# Patient Record
Sex: FEMALE | Race: WHITE | Employment: OTHER | URBAN - NONMETROPOLITAN AREA
[De-identification: names, ages, dates, MRNs, and addresses within clinical notes are randomized per-mention and may not be internally consistent; named-entity substitution may affect disease eponyms.]

---

## 2023-06-15 ENCOUNTER — OFFICE VISIT (OUTPATIENT)
Dept: MEDICAL GROUP | Facility: PHYSICIAN GROUP | Age: 63
End: 2023-06-15
Payer: COMMERCIAL

## 2023-06-15 ENCOUNTER — HOSPITAL ENCOUNTER (OUTPATIENT)
Facility: MEDICAL CENTER | Age: 63
End: 2023-06-15
Attending: NURSE PRACTITIONER
Payer: COMMERCIAL

## 2023-06-15 VITALS
DIASTOLIC BLOOD PRESSURE: 62 MMHG | BODY MASS INDEX: 21.79 KG/M2 | OXYGEN SATURATION: 97 % | TEMPERATURE: 97.8 F | WEIGHT: 123 LBS | SYSTOLIC BLOOD PRESSURE: 108 MMHG | HEIGHT: 63 IN | RESPIRATION RATE: 14 BRPM | HEART RATE: 67 BPM

## 2023-06-15 DIAGNOSIS — M62.838 MUSCLE SPASMS OF NECK: ICD-10-CM

## 2023-06-15 DIAGNOSIS — E78.2 MIXED HYPERLIPIDEMIA: ICD-10-CM

## 2023-06-15 DIAGNOSIS — D31.32 NEVUS OF CHOROID OF LEFT EYE: ICD-10-CM

## 2023-06-15 DIAGNOSIS — R42 VERTIGO: ICD-10-CM

## 2023-06-15 DIAGNOSIS — D22.9 BENIGN SKIN MOLE: ICD-10-CM

## 2023-06-15 DIAGNOSIS — N94.10 DYSPAREUNIA IN FEMALE: ICD-10-CM

## 2023-06-15 DIAGNOSIS — Z96.1 PSEUDOPHAKIA: ICD-10-CM

## 2023-06-15 PROCEDURE — 99204 OFFICE O/P NEW MOD 45 MIN: CPT | Performed by: NURSE PRACTITIONER

## 2023-06-15 PROCEDURE — G0481 DRUG TEST DEF 8-14 CLASSES: HCPCS

## 2023-06-15 PROCEDURE — 3078F DIAST BP <80 MM HG: CPT | Performed by: NURSE PRACTITIONER

## 2023-06-15 PROCEDURE — 3074F SYST BP LT 130 MM HG: CPT | Performed by: NURSE PRACTITIONER

## 2023-06-15 RX ORDER — DIAZEPAM 5 MG/1
5 TABLET ORAL DAILY
COMMUNITY
End: 2023-06-15 | Stop reason: SDUPTHER

## 2023-06-15 RX ORDER — DIAZEPAM 5 MG/1
5 TABLET ORAL
Qty: 20 TABLET | Refills: 0 | Status: SHIPPED | OUTPATIENT
Start: 2023-06-15 | End: 2023-07-15

## 2023-06-15 SDOH — ECONOMIC STABILITY: FOOD INSECURITY: WITHIN THE PAST 12 MONTHS, YOU WORRIED THAT YOUR FOOD WOULD RUN OUT BEFORE YOU GOT MONEY TO BUY MORE.: PATIENT DECLINED

## 2023-06-15 SDOH — HEALTH STABILITY: PHYSICAL HEALTH: ON AVERAGE, HOW MANY MINUTES DO YOU ENGAGE IN EXERCISE AT THIS LEVEL?: 30 MIN

## 2023-06-15 SDOH — ECONOMIC STABILITY: HOUSING INSECURITY

## 2023-06-15 SDOH — ECONOMIC STABILITY: TRANSPORTATION INSECURITY
IN THE PAST 12 MONTHS, HAS LACK OF TRANSPORTATION KEPT YOU FROM MEETINGS, WORK, OR FROM GETTING THINGS NEEDED FOR DAILY LIVING?: PATIENT DECLINED

## 2023-06-15 SDOH — ECONOMIC STABILITY: FOOD INSECURITY: WITHIN THE PAST 12 MONTHS, THE FOOD YOU BOUGHT JUST DIDN'T LAST AND YOU DIDN'T HAVE MONEY TO GET MORE.: PATIENT DECLINED

## 2023-06-15 SDOH — ECONOMIC STABILITY: HOUSING INSECURITY
IN THE LAST 12 MONTHS, WAS THERE A TIME WHEN YOU DID NOT HAVE A STEADY PLACE TO SLEEP OR SLEPT IN A SHELTER (INCLUDING NOW)?: PATIENT REFUSED

## 2023-06-15 SDOH — ECONOMIC STABILITY: TRANSPORTATION INSECURITY
IN THE PAST 12 MONTHS, HAS LACK OF RELIABLE TRANSPORTATION KEPT YOU FROM MEDICAL APPOINTMENTS, MEETINGS, WORK OR FROM GETTING THINGS NEEDED FOR DAILY LIVING?: PATIENT DECLINED

## 2023-06-15 SDOH — ECONOMIC STABILITY: INCOME INSECURITY: IN THE LAST 12 MONTHS, WAS THERE A TIME WHEN YOU WERE NOT ABLE TO PAY THE MORTGAGE OR RENT ON TIME?: PATIENT REFUSED

## 2023-06-15 SDOH — HEALTH STABILITY: PHYSICAL HEALTH: ON AVERAGE, HOW MANY DAYS PER WEEK DO YOU ENGAGE IN MODERATE TO STRENUOUS EXERCISE (LIKE A BRISK WALK)?: 5 DAYS

## 2023-06-15 SDOH — ECONOMIC STABILITY: TRANSPORTATION INSECURITY
IN THE PAST 12 MONTHS, HAS THE LACK OF TRANSPORTATION KEPT YOU FROM MEDICAL APPOINTMENTS OR FROM GETTING MEDICATIONS?: PATIENT DECLINED

## 2023-06-15 SDOH — ECONOMIC STABILITY: INCOME INSECURITY: HOW HARD IS IT FOR YOU TO PAY FOR THE VERY BASICS LIKE FOOD, HOUSING, MEDICAL CARE, AND HEATING?: PATIENT DECLINED

## 2023-06-15 SDOH — HEALTH STABILITY: MENTAL HEALTH
STRESS IS WHEN SOMEONE FEELS TENSE, NERVOUS, ANXIOUS, OR CAN'T SLEEP AT NIGHT BECAUSE THEIR MIND IS TROUBLED. HOW STRESSED ARE YOU?: NOT AT ALL

## 2023-06-15 ASSESSMENT — SOCIAL DETERMINANTS OF HEALTH (SDOH)
WITHIN THE PAST 12 MONTHS, YOU WORRIED THAT YOUR FOOD WOULD RUN OUT BEFORE YOU GOT THE MONEY TO BUY MORE: PATIENT DECLINED
ARE YOU MARRIED, WIDOWED, DIVORCED, SEPARATED, NEVER MARRIED, OR LIVING WITH A PARTNER?: PATIENT DECLINED
HOW OFTEN DO YOU HAVE SIX OR MORE DRINKS ON ONE OCCASION: PATIENT DECLINED
IN A TYPICAL WEEK, HOW MANY TIMES DO YOU TALK ON THE PHONE WITH FAMILY, FRIENDS, OR NEIGHBORS?: PATIENT DECLINED
IN A TYPICAL WEEK, HOW MANY TIMES DO YOU TALK ON THE PHONE WITH FAMILY, FRIENDS, OR NEIGHBORS?: PATIENT DECLINED
DO YOU BELONG TO ANY CLUBS OR ORGANIZATIONS SUCH AS CHURCH GROUPS UNIONS, FRATERNAL OR ATHLETIC GROUPS, OR SCHOOL GROUPS?: PATIENT DECLINED
HOW MANY DRINKS CONTAINING ALCOHOL DO YOU HAVE ON A TYPICAL DAY WHEN YOU ARE DRINKING: PATIENT DECLINED
HOW OFTEN DO YOU ATTEND CHURCH OR RELIGIOUS SERVICES?: PATIENT DECLINED
HOW OFTEN DO YOU GET TOGETHER WITH FRIENDS OR RELATIVES?: PATIENT DECLINED
HOW OFTEN DO YOU ATTEND CHURCH OR RELIGIOUS SERVICES?: PATIENT DECLINED
HOW OFTEN DO YOU HAVE A DRINK CONTAINING ALCOHOL: PATIENT DECLINED
HOW HARD IS IT FOR YOU TO PAY FOR THE VERY BASICS LIKE FOOD, HOUSING, MEDICAL CARE, AND HEATING?: PATIENT DECLINED
DO YOU BELONG TO ANY CLUBS OR ORGANIZATIONS SUCH AS CHURCH GROUPS UNIONS, FRATERNAL OR ATHLETIC GROUPS, OR SCHOOL GROUPS?: PATIENT DECLINED
HOW OFTEN DO YOU ATTENT MEETINGS OF THE CLUB OR ORGANIZATION YOU BELONG TO?: PATIENT DECLINED
ARE YOU MARRIED, WIDOWED, DIVORCED, SEPARATED, NEVER MARRIED, OR LIVING WITH A PARTNER?: PATIENT DECLINED
HOW OFTEN DO YOU ATTENT MEETINGS OF THE CLUB OR ORGANIZATION YOU BELONG TO?: PATIENT DECLINED
HOW OFTEN DO YOU GET TOGETHER WITH FRIENDS OR RELATIVES?: PATIENT DECLINED

## 2023-06-15 ASSESSMENT — ENCOUNTER SYMPTOMS
CHILLS: 0
PSYCHIATRIC NEGATIVE: 1
WEIGHT LOSS: 0
FEVER: 0
SHORTNESS OF BREATH: 0
GASTROINTESTINAL NEGATIVE: 1
NEUROLOGICAL NEGATIVE: 1
EYES NEGATIVE: 1
MUSCULOSKELETAL NEGATIVE: 1

## 2023-06-15 ASSESSMENT — LIFESTYLE VARIABLES
HOW MANY STANDARD DRINKS CONTAINING ALCOHOL DO YOU HAVE ON A TYPICAL DAY: PATIENT DECLINED
AUDIT-C TOTAL SCORE: -1
SKIP TO QUESTIONS 9-10: 0
HOW OFTEN DO YOU HAVE A DRINK CONTAINING ALCOHOL: PATIENT DECLINED
HOW OFTEN DO YOU HAVE SIX OR MORE DRINKS ON ONE OCCASION: PATIENT DECLINED

## 2023-06-15 ASSESSMENT — FIBROSIS 4 INDEX: FIB4 SCORE: 0.76

## 2023-06-15 ASSESSMENT — PATIENT HEALTH QUESTIONNAIRE - PHQ9: CLINICAL INTERPRETATION OF PHQ2 SCORE: 0

## 2023-06-15 NOTE — PROGRESS NOTES
Chief Complaint   Patient presents with    Establish Care    Referral Needed      Subjective:     Doris Grullon is a 62 y.o. female presenting for      Problem   Dyspareunia in Female    Patient states a history of this.  Any that she has noted.  She states that has caused some significant discomfort for her in the past and has never felt comfortable talking to anybody about this.  She denies any use of any type of medications or lubricants to help out with this but is interested today.     Muscle Spasms of Neck    History of shoulder pain for years  Takes valium 5 mg as needed  Notes it is on her side she hold leash with dogs and they pull at times  Uses advil more often but notes that when she does needs to use it it is rare   Notes last refill was 10/2022  Requesting refill  Has never completed PT for discomfort but stretches at home often       Vertigo    Patient notes that she has had this for many years.  She states it mostly happens in April at times causing her violent nausea vomiting.  She states that she uses the Epley maneuver as needed.  She also notes however that Valium 5 mg she uses for the vertigo.       Mixed Hyperlipidemia    Has been eating more dessert after dinner  Not as active as she use to be since being retired  Labs from 9/2022     Pseudophakia    History of surgery     Insufficiency of Tear Film of Both Eyes   Nevus of Choroid of Left Eye    Follows with Dr. Irene ophthalmology who continues to track this          Review of Systems   Constitutional:  Negative for chills, fever, malaise/fatigue and weight loss.   HENT: Negative.     Eyes: Negative.    Respiratory:  Negative for shortness of breath.    Cardiovascular:  Negative for chest pain.   Gastrointestinal: Negative.    Genitourinary: Negative.    Musculoskeletal: Negative.    Skin: Negative.    Neurological: Negative.    Psychiatric/Behavioral: Negative.            Current Outpatient Medications:     diazePAM (VALIUM) 5 MG Tab,  Take 1 Tablet by mouth one time as needed for Anxiety for up to 30 days., Disp: 20 Tablet, Rfl: 0    multivitamin (THERAGRAN) Tab, Take 1 Tablet by mouth every day., Disp: , Rfl:     Famotidine (PEPCID PO), Take  by mouth., Disp: , Rfl:     Calcium Carbonate Antacid (TUMS PO), Take  by mouth., Disp: , Rfl:     Past Medical History:   Diagnosis Date    Allergy     Arthritis     Back pain     Back pain     Cataract     GERD (gastroesophageal reflux disease)     Heart murmur     Heartburn     Insomnia     Neck stiffness        Past Surgical History:   Procedure Laterality Date    EYE SURGERY  2010    cataract        Social History     Tobacco Use    Smoking status: Former     Packs/day: 1.00     Years: 10.00     Pack years: 10.00     Types: Cigarettes     Quit date: 3/23/1990     Years since quittin.2    Smokeless tobacco: Never   Vaping Use    Vaping Use: Never used   Substance Use Topics    Alcohol use: Yes     Alcohol/week: 2.4 oz     Types: 2 Glasses of wine, 2 Cans of beer per week    Drug use: Never       Family History   Problem Relation Age of Onset    Heart Disease Mother         chf    Hypertension Mother     Lung Disease Mother     Psychiatric Illness Mother         Paranoid Schizophrenia    Cancer Father         Prostate Cancer that metastasized to his bones    Heart Disease Father         Heart Attack    No Known Problems Sister     No Known Problems Sister     Cancer Brother         Recently diagnosed 10/2022with stage 4 prostate cancer that has metastasized to his bones    No Known Problems Brother     No Known Problems Maternal Grandmother     No Known Problems Maternal Grandfather     No Known Problems Paternal Grandmother     No Known Problems Paternal Grandfather        Sulfisoxazole acetyl    Allergies, past medical history, past surgical history, family history, social history reviewed and updated    Objective:     Vitals: /62 (BP Location: Left arm, Patient Position: Sitting, BP Cuff  "Size: Adult)   Pulse 67   Temp 36.6 °C (97.8 °F) (Temporal)   Resp 14   Ht 1.6 m (5' 3\")   Wt 55.8 kg (123 lb)   SpO2 97%   BMI 21.79 kg/m²     Physical Exam  Constitutional:       Appearance: Normal appearance. She is normal weight.   HENT:      Head: Normocephalic and atraumatic.      Right Ear: Tympanic membrane, ear canal and external ear normal.      Left Ear: Tympanic membrane, ear canal and external ear normal.      Nose: Nose normal.      Mouth/Throat:      Mouth: Mucous membranes are moist.      Pharynx: Oropharynx is clear.   Eyes:      Extraocular Movements: Extraocular movements intact.      Conjunctiva/sclera: Conjunctivae normal.      Pupils: Pupils are equal, round, and reactive to light.   Cardiovascular:      Rate and Rhythm: Normal rate and regular rhythm.      Pulses: Normal pulses.      Heart sounds: Normal heart sounds.   Pulmonary:      Effort: Pulmonary effort is normal.      Breath sounds: Normal breath sounds.   Abdominal:      General: Abdomen is flat. Bowel sounds are normal.      Palpations: Abdomen is soft.   Musculoskeletal:         General: Normal range of motion.      Cervical back: Normal range of motion and neck supple. No tenderness.   Lymphadenopathy:      Cervical: No cervical adenopathy.   Skin:     General: Skin is warm and dry.      Capillary Refill: Capillary refill takes less than 2 seconds.   Neurological:      General: No focal deficit present.      Mental Status: She is alert and oriented to person, place, and time.   Psychiatric:         Mood and Affect: Mood normal.         Behavior: Behavior normal.         Thought Content: Thought content normal.         Judgment: Judgment normal.         Assessment/Plan:     1. Nevus of choroid of left eye  Chronic and stable condition  - Referral to Ophthalmology    2. Dyspareunia in female  Chronic and stable condition  Discussed today multiple options such as her replacement therapy but also lubrication that she can order " online.  We discussed the best use would be something that is pH balance does not affect her pH of the vaginal canal as well as a water-soluble lubrication.  Instructed patient to try this if she notes no improvement that we can discuss possible hormone replacement therapy vaginally    3. Muscle spasms of neck  Chronic and stable condition  PDMP reviewed  - diazePAM (VALIUM) 5 MG Tab; Take 1 Tablet by mouth one time as needed for Anxiety for up to 30 days.  Dispense: 20 Tablet; Refill: 0  - Controlled Substance Treatment Agreement  - Pain Management Screen; Future    4. Benign skin mole  Chronic and stable condition  - Referral to Dermatology    5. Vertigo  Chronic and stable condition  PDMP reviewed  - diazePAM (VALIUM) 5 MG Tab; Take 1 Tablet by mouth one time as needed for Anxiety for up to 30 days.  Dispense: 20 Tablet; Refill: 0  - Controlled Substance Treatment Agreement  - Pain Management Screen; Future    6. Mixed hyperlipidemia  Chronic and stable condition    7. Pseudophakia  Chronic and stable condition       Discussed with patient possible alternative diagnoses, patient is to take all medications as prescribed.     If symptoms persist FU w/PCP, if symptoms worsen go to emergency room.     If experiencing any side effects from prescribed medications reports to the office immediately or go to emergency room.    Reviewed indication, dosage, usage and potential adverse effects of prescribed medications.     Reviewed risks and benefits of treatment plan. Patient verbalizes understanding of all instruction and verbally agrees to plan.    Discussed plan with the patient, and she agrees to the above.      I personally reviewed prior external notes and test results pertinent to today's visit.        Return for Pending labs in September.      Please note that this dictation was created using voice recognition software. I have made every reasonable attempt to correct obvious errors, but I expect that there may be  errors of grammar and possibly content that I did not discover before finalizing the note.

## 2023-06-20 LAB
1OH-MIDAZOLAM UR QL SCN: NOT DETECTED
6MAM UR QL: NOT DETECTED
7AMINOCLONAZEPAM UR QL: NOT DETECTED
A-OH ALPRAZ UR QL: NOT DETECTED
ALPRAZ UR QL: NOT DETECTED
AMPHET UR QL SCN: NOT DETECTED
ANNOTATION COMMENT IMP: NORMAL
ANNOTATION COMMENT IMP: NORMAL
BARBITURATES UR QL: NOT DETECTED
BUPRENORPHINE UR QL: NOT DETECTED
BZE UR QL: NOT DETECTED
CARBOXYTHC UR QL: NOT DETECTED
CARISOPRODOL UR QL: NOT DETECTED
CLONAZEPAM UR QL: NOT DETECTED
CODEINE UR QL: NOT DETECTED
DIAZEPAM UR QL: NOT DETECTED
ETHYL GLUCURONIDE UR QL: NOT DETECTED
FENTANYL UR QL: NOT DETECTED
GABAPENTIN UR QL: NOT DETECTED
HYDROCODONE UR QL: NOT DETECTED
HYDROMORPHONE UR QL: NOT DETECTED
LORAZEPAM UR QL: NOT DETECTED
MDA UR QL: NOT DETECTED
MDEA UR QL: NOT DETECTED
MDMA UR QL: NOT DETECTED
MEPERIDINE UR QL: NOT DETECTED
METHADONE UR QL: NOT DETECTED
METHAMPHET UR QL: NOT DETECTED
MIDAZOLAM UR QL SCN: NOT DETECTED
MORPHINE UR QL: NOT DETECTED
NALOXONE UR QL SCN: NOT DETECTED
NORBUPRENORPHINE UR QL CFM: NOT DETECTED
NORDIAZEPAM UR QL: NOT DETECTED
NORFENTANYL UR QL: NOT DETECTED
NORHYDROCODONE UR QL CFM: NOT DETECTED
NOROXYCODONE UR QL CFM: NOT DETECTED
NOROXYMORPH CO100 Q0458: NOT DETECTED
OXAZEPAM UR QL: NOT DETECTED
OXYCODONE UR QL: NOT DETECTED
OXYMORPHONE UR QL: NOT DETECTED
PATHOLOGY STUDY: NORMAL
PCP UR QL: NOT DETECTED
PHENTERMINE UR QL: NOT DETECTED
PPAA UR QL: NOT DETECTED
PREGABALIN UR QL SCN: NOT DETECTED
SERVICE CMNT-IMP: NORMAL
TAPENADOL OSULF CO200 Q0473: NOT DETECTED
TAPENTADOL UR QL SCN: NOT DETECTED
TEMAZEPAM UR QL: NOT DETECTED
TRAMADOL UR QL: NOT DETECTED
ZOLPIDEM PHENYL-4-CARB UR QL SCN: NOT DETECTED
ZOLPIDEM UR QL: NOT DETECTED

## 2023-09-19 ENCOUNTER — OFFICE VISIT (OUTPATIENT)
Dept: MEDICAL GROUP | Facility: PHYSICIAN GROUP | Age: 63
End: 2023-09-19
Payer: COMMERCIAL

## 2023-09-19 VITALS
SYSTOLIC BLOOD PRESSURE: 112 MMHG | TEMPERATURE: 97.9 F | WEIGHT: 117 LBS | RESPIRATION RATE: 12 BRPM | HEART RATE: 66 BPM | BODY MASS INDEX: 20.73 KG/M2 | OXYGEN SATURATION: 97 % | DIASTOLIC BLOOD PRESSURE: 68 MMHG | HEIGHT: 63 IN

## 2023-09-19 DIAGNOSIS — R19.7 DIARRHEA, UNSPECIFIED TYPE: ICD-10-CM

## 2023-09-19 PROCEDURE — 3074F SYST BP LT 130 MM HG: CPT | Performed by: NURSE PRACTITIONER

## 2023-09-19 PROCEDURE — 3078F DIAST BP <80 MM HG: CPT | Performed by: NURSE PRACTITIONER

## 2023-09-19 PROCEDURE — 99213 OFFICE O/P EST LOW 20 MIN: CPT | Performed by: NURSE PRACTITIONER

## 2023-09-19 ASSESSMENT — ENCOUNTER SYMPTOMS
WEIGHT LOSS: 0
DIARRHEA: 1
CHILLS: 0
SHORTNESS OF BREATH: 0
NAUSEA: 0
ABDOMINAL PAIN: 0
CONSTIPATION: 0
BLOOD IN STOOL: 0
VOMITING: 0
HEADACHES: 0
FEVER: 0
DIZZINESS: 0

## 2023-09-19 ASSESSMENT — FIBROSIS 4 INDEX: FIB4 SCORE: 0.77

## 2023-09-19 NOTE — PROGRESS NOTES
"CC:  Chief Complaint   Patient presents with    Diarrhea     X4wk, states taking over the counter medication        HISTORY OF PRESENT ILLNESS: Patient is a 63 y.o. female established patient presenting     Problem   Diarrhea    Chronic condition for many years however worse in the last 4 weeks  Notes increase in gas   Has been recently using gummies and notes it has sugar alcohol in it and she has problems with it   Notes after eating   Has been taking imodium, pepto and pepcid  Denies foul odor  Denies recent travel, antibiotics,   denies pain, blood in stool, chills, fevers, no change in color           Review of Systems   Constitutional:  Negative for chills, fever, malaise/fatigue and weight loss.   Respiratory:  Negative for shortness of breath.    Cardiovascular:  Negative for chest pain.   Gastrointestinal:  Positive for diarrhea. Negative for abdominal pain, blood in stool, constipation, melena, nausea and vomiting.   Neurological:  Negative for dizziness and headaches.             - NOTE: All other systems reviewed and are negative, except as in HPI.      Exam:    /68   Pulse 66   Temp 36.6 °C (97.9 °F) (Temporal)   Resp 12   Ht 1.6 m (5' 3\")   Wt 53.1 kg (117 lb)   SpO2 97%  Body mass index is 20.73 kg/m².    Physical Exam  Constitutional:       General: She is not in acute distress.     Appearance: Normal appearance. She is normal weight. She is not ill-appearing.   HENT:      Head: Normocephalic and atraumatic.   Abdominal:      General: Abdomen is flat. There is no distension.      Palpations: Abdomen is soft. There is no mass.      Tenderness: There is no abdominal tenderness. There is no right CVA tenderness, left CVA tenderness, guarding or rebound.      Hernia: No hernia is present.      Comments: Hyperactive bowel sounds   Skin:     General: Skin is warm and dry.      Capillary Refill: Capillary refill takes less than 2 seconds.   Neurological:      Mental Status: She is alert and " oriented to person, place, and time.   Psychiatric:         Behavior: Behavior normal.           Assessment/Plan:    1. Diarrhea, unspecified type  Undiagnosed new condition uncertain prognosis  Patient is aware that she needs to stop her Pepcid for 2 weeks prior to taking her H. pylori.  Patient will also stop her CBD to see if the sugar alcohols from the Gummies  - CELIAC DISEASE AB PANEL; Future  - H.PYLORI STOOL ANTIGEN; Future  - Cdiff By PCR Rflx Toxin; Future  - CULTURE STOOL; Future  - Complete O&P; Future       Discussed with patient possible alternative diagnoses, patient is to take all medications as prescribed.     If symptoms persist FU w/PCP, if symptoms worsen go to emergency room.     If experiencing any side effects from prescribed medications reports to the office immediately or go to emergency room.    Reviewed indication, dosage, usage and potential adverse effects of prescribed medications.     Reviewed risks and benefits of treatment plan. Patient verbalizes understanding of all instruction and verbally agrees to plan.      Return in about 2 weeks (around 10/3/2023) for Pending stool samples.      Please note that this dictation was created using voice recognition software. I have made every reasonable attempt to correct obvious errors, but I expect that there are errors of grammar and possibly content that I did not discover before finalizing the note.

## 2023-11-10 DIAGNOSIS — D31.32 NEVUS OF CHOROID OF LEFT EYE: ICD-10-CM

## 2023-11-10 DIAGNOSIS — M79.672 LEFT FOOT PAIN: ICD-10-CM

## 2024-01-25 DIAGNOSIS — D31.32 NEVUS OF CHOROID OF LEFT EYE: ICD-10-CM

## 2024-02-08 DIAGNOSIS — H04.123 INSUFFICIENCY OF TEAR FILM OF BOTH EYES: ICD-10-CM

## 2024-02-08 DIAGNOSIS — D31.32 NEVUS OF CHOROID OF LEFT EYE: ICD-10-CM

## 2024-05-30 ENCOUNTER — OFFICE VISIT (OUTPATIENT)
Dept: MEDICAL GROUP | Facility: PHYSICIAN GROUP | Age: 64
End: 2024-05-30
Payer: COMMERCIAL

## 2024-05-30 ENCOUNTER — APPOINTMENT (OUTPATIENT)
Dept: MEDICAL GROUP | Facility: PHYSICIAN GROUP | Age: 64
End: 2024-05-30
Payer: COMMERCIAL

## 2024-05-30 ENCOUNTER — HOSPITAL ENCOUNTER (OUTPATIENT)
Facility: MEDICAL CENTER | Age: 64
End: 2024-05-30
Attending: NURSE PRACTITIONER
Payer: COMMERCIAL

## 2024-05-30 VITALS
RESPIRATION RATE: 18 BRPM | DIASTOLIC BLOOD PRESSURE: 72 MMHG | WEIGHT: 120 LBS | OXYGEN SATURATION: 98 % | TEMPERATURE: 97.6 F | BODY MASS INDEX: 21.26 KG/M2 | SYSTOLIC BLOOD PRESSURE: 118 MMHG | HEIGHT: 63 IN | HEART RATE: 63 BPM

## 2024-05-30 DIAGNOSIS — M25.519 CHRONIC SHOULDER PAIN, UNSPECIFIED LATERALITY: ICD-10-CM

## 2024-05-30 DIAGNOSIS — Z79.899 ENCOUNTER FOR LONG-TERM (CURRENT) USE OF HIGH-RISK MEDICATION: ICD-10-CM

## 2024-05-30 DIAGNOSIS — M62.838 MUSCLE SPASMS OF NECK: ICD-10-CM

## 2024-05-30 DIAGNOSIS — G89.29 CHRONIC SHOULDER PAIN, UNSPECIFIED LATERALITY: ICD-10-CM

## 2024-05-30 PROBLEM — M77.42 METATARSALGIA OF LEFT FOOT: Status: ACTIVE | Noted: 2023-11-27

## 2024-05-30 RX ORDER — DIAZEPAM 5 MG/1
5 TABLET ORAL
Qty: 40 TABLET | Refills: 0 | Status: SHIPPED | OUTPATIENT
Start: 2024-05-30 | End: 2024-07-29

## 2024-05-30 RX ORDER — DIAZEPAM 5 MG/1
5 TABLET ORAL
Qty: 30 TABLET | Status: CANCELLED | OUTPATIENT
Start: 2024-05-30

## 2024-05-30 RX ORDER — DIAZEPAM 5 MG/1
1 TABLET ORAL
COMMUNITY
End: 2024-05-30 | Stop reason: SDUPTHER

## 2024-05-30 RX ORDER — DIAZEPAM 5 MG/1
5 TABLET ORAL
Qty: 40 TABLET | Refills: 0 | Status: CANCELLED | OUTPATIENT
Start: 2024-05-30 | End: 2024-07-29

## 2024-05-30 ASSESSMENT — ENCOUNTER SYMPTOMS
SHORTNESS OF BREATH: 0
BACK PAIN: 1
TINGLING: 0
NERVOUS/ANXIOUS: 0
SPUTUM PRODUCTION: 0
CHILLS: 0
FEVER: 0
SENSORY CHANGE: 0
FALLS: 0
HEADACHES: 0
DIZZINESS: 0
NECK PAIN: 1
WEIGHT LOSS: 0
WEAKNESS: 0
COUGH: 0
PALPITATIONS: 0
MEMORY LOSS: 0
MYALGIAS: 1
ORTHOPNEA: 0
DEPRESSION: 0
INSOMNIA: 0
WHEEZING: 0

## 2024-05-30 ASSESSMENT — PATIENT HEALTH QUESTIONNAIRE - PHQ9: CLINICAL INTERPRETATION OF PHQ2 SCORE: 0

## 2024-05-30 ASSESSMENT — FIBROSIS 4 INDEX: FIB4 SCORE: 0.77

## 2024-05-30 ASSESSMENT — LIFESTYLE VARIABLES: SUBSTANCE_ABUSE: 0

## 2024-05-30 NOTE — TELEPHONE ENCOUNTER
Received request via: Patient    Was the patient seen in the last year in this department? Yes    Does the patient have an active prescription (recently filled or refills available) for medication(s) requested? No    Pharmacy Name: Pharmacy:   [facility] [address] [place] [address] - [id number]     Does the patient have [facility] Plus and need 100 day supply (blood pressure, diabetes and cholesterol meds only)? Patient does not have SCP

## 2024-05-30 NOTE — PROGRESS NOTES
Family Nurse Practitioner Student Note    Cosigner/Preceptor: MARTHA Mcgee    Chief Complaint   Patient presents with    Medication Refill     Diazepam refill                                                                                                                                    HPI:   Doris is a 64 y/o female who presents alone to the office today with a request to refill her prescription for diazepam 5 mg tablets used once per day as needed for left scapular pain that radiates to left lateral neck. She states she has had the pain intermittently over the last 10 years and describes the pain as a deep ache that radiates to the neck. Pain is exacerbated by excessive use of her LUE and states she has had worsening frequency of pain since February 2024 after her  experienced a stroke with unilateral paralysis. 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 She states she was previously taking 5 mg of diazepam every 1-2 weeks, however ran out 2-4 months ago. She has also used 400 mg ibuprofen nightly and her TENS unit for relief. Her last refill was in June 2023, PDMP verified by JADIEL Mcgee. She is currently requesting an increase in the amount of tablets received to last without running out.     Current Outpatient Medications   Medication Sig Dispense Refill    diazePAM (VALIUM) 5 MG Tab Take 1 Tablet by mouth 1 time a day as needed.      multivitamin (THERAGRAN) Tab Take 1 Tablet by mouth every day.      Famotidine (PEPCID PO) Take  by mouth one time as needed.      Calcium Carbonate Antacid (TUMS PO) Take  by mouth.       No current facility-administered medications for this visit.       Allergies as of 05/30/2024 - Reviewed 05/30/2024   Allergen Reaction Noted    Sulfisoxazole Hives 05/30/2024    Sulfisoxazole acetyl Hives 11/10/2008      Review of Systems   Constitutional:  Positive for malaise/fatigue. Negative for  "chills, fever and weight loss.   Respiratory:  Negative for cough, sputum production, shortness of breath and wheezing.    Cardiovascular:  Negative for chest pain, palpitations and orthopnea.   Musculoskeletal:  Positive for back pain, myalgias and neck pain. Negative for falls and joint pain.   Skin:  Negative for itching and rash.   Neurological:  Negative for dizziness, tingling, sensory change, weakness and headaches.   Psychiatric/Behavioral:  Negative for depression, memory loss and substance abuse. The patient is not nervous/anxious and does not have insomnia.        Assessment:  /72   Pulse 63   Temp 36.4 °C (97.6 °F) (Temporal)   Resp 18   Ht 1.6 m (5' 3\")   Wt 54.4 kg (120 lb)   SpO2 98%   BMI 21.26 kg/m²     Physical Exam  Constitutional:       General: She is not in acute distress.     Appearance: Normal appearance. She is normal weight. She is not ill-appearing or diaphoretic.   Neck:     Cardiovascular:      Rate and Rhythm: Normal rate and regular rhythm.      Pulses: Normal pulses.      Heart sounds: Normal heart sounds. No murmur heard.     No friction rub. No gallop.   Pulmonary:      Effort: Pulmonary effort is normal. No respiratory distress.      Breath sounds: Normal breath sounds. No stridor. No wheezing, rhonchi or rales.   Chest:      Chest wall: No tenderness.   Musculoskeletal:      Left shoulder: Tenderness present. No swelling, deformity or crepitus. Normal range of motion. Normal strength.        Arms:       Cervical back: No edema, erythema, signs of trauma, rigidity, torticollis or crepitus. Pain with movement and muscular tenderness present. No spinous process tenderness.   Skin:     General: Skin is warm and dry.      Findings: No bruising, erythema or lesion.   Neurological:      Mental Status: She is alert.      Sensory: No sensory deficit.      Motor: No weakness.   Psychiatric:         Mood and Affect: Mood normal. Mood is not anxious.         Behavior: Behavior " normal.         Thought Content: Thought content normal.         Judgment: Judgment normal.     PHQ2 5/30/2024 = 0    Assessment and Plan:  63 y.o. female with the following issues.    1. Encounter for long-term (current) use of high-risk medication  Chronic and stable condition  - PAIN MANAGEMENT SCRN, W/ RFLX TO QNT; Future  - Controlled Substance Treatment Agreement  - Controlled substance treatment agreement signed by patient. Urine drug screen collected   -PHQ2 5/30/2024 = 0  -diazepam (Valium) 5 mg tab by mouth once daily as needed for shoulder/neck pain and spasm (will call with pharmacy information today)    2. Muscle spasms of neck  Chronic and stable condition  - PAIN MANAGEMENT SCRN, W/ RFLX TO QNT; Future  - Controlled Substance Treatment Agreement  - Controlled substance treatment agreement signed by patient. Urine drug screen collected   -PHQ2 5/30/2024 = 0  -diazepam (Valium) 5 mg tab by mouth once daily as needed for shoulder/neck pain and spasm (will call with pharmacy information today)    3. Chronic shoulder pain, unspecified laterality  Chronic and stable condtion  - PAIN MANAGEMENT SCRN, W/ RFLX TO QNT; Future  - Controlled Substance Treatment Agreement   -PHQ2 5/30/2024 = 0  -diazepam (Valium) 5 mg tab by mouth once daily as needed for shoulder/neck pain and spasm (will call with pharmacy information today)  - Controlled substance treatment agreement signed by patient. Urine drug screen collected      Diazepam is sometimes prescribed for neck and shoulder pain to help relax muscles and reduce discomfort. However, it is typically used for short-term relief due to potential side effects and risk of dependence. Please use diazepam sparingly and only as intended/ordered. Chronic pain management often involves a combination of therapies to help improve pain and quality of life. Please continue to use 400 mg ibuprofen every 6-8 hours as needed in conjunction with diazepam. Continue to use your TENS  unit for muscle stimulation and relaxation. Try application of heat 10-20 minutes to relax the muscles.     Call the office to follow-up as needed.

## 2024-06-01 LAB
AMPHET CTO UR CFM-MCNC: NEGATIVE NG/ML
BARBITURATES CTO UR CFM-MCNC: NEGATIVE NG/ML
BENZODIAZ CTO UR CFM-MCNC: NEGATIVE NG/ML
BUPRENORPHINE UR-MCNC: NEGATIVE NG/ML
CANNABINOIDS CTO UR CFM-MCNC: NEGATIVE NG/ML
CARISOPRODOL UR-MCNC: NEGATIVE NG/ML
COCAINE CTO UR CFM-MCNC: NEGATIVE NG/ML
CREAT UR-MCNC: <20 MG/DL (ref 20–400)
DRUG SCREEN COMMENT UR-IMP: NORMAL
ETHYL GLUCURONIDE UR QL SCN: NEGATIVE NG/ML
FENTANYL UR-MCNC: NEGATIVE NG/ML
MEPERIDINE CTO UR SCN-MCNC: NEGATIVE NG/ML
METHADONE CTO UR CFM-MCNC: NEGATIVE NG/ML
OPIATES UR QL SCN: NEGATIVE NG/ML
OXYCDOXYM URSCRN 2005102: NEGATIVE NG/ML
PCP CTO UR CFM-MCNC: NEGATIVE NG/ML
PROPOXYPH CTO UR CFM-MCNC: NEGATIVE NG/ML
TAPENTADOL UR-MCNC: NEGATIVE NG/ML
TRAMADOL CTO UR SCN-MCNC: NEGATIVE NG/ML
ZOLPIDEM UR-MCNC: NEGATIVE NG/ML

## 2024-10-01 DIAGNOSIS — L98.9 SKIN LESION: ICD-10-CM
